# Patient Record
Sex: FEMALE | Race: BLACK OR AFRICAN AMERICAN | NOT HISPANIC OR LATINO | URBAN - METROPOLITAN AREA
[De-identification: names, ages, dates, MRNs, and addresses within clinical notes are randomized per-mention and may not be internally consistent; named-entity substitution may affect disease eponyms.]

---

## 2018-09-01 ENCOUNTER — OUTPATIENT (OUTPATIENT)
Dept: OUTPATIENT SERVICES | Facility: HOSPITAL | Age: 33
LOS: 1 days | End: 2018-09-01
Payer: MEDICAID

## 2018-09-01 PROCEDURE — G9001: CPT

## 2018-09-04 ENCOUNTER — EMERGENCY (EMERGENCY)
Facility: HOSPITAL | Age: 33
LOS: 1 days | Discharge: HOME | End: 2018-09-04
Attending: EMERGENCY MEDICINE | Admitting: EMERGENCY MEDICINE
Payer: MEDICAID

## 2018-09-04 VITALS
DIASTOLIC BLOOD PRESSURE: 61 MMHG | OXYGEN SATURATION: 100 % | SYSTOLIC BLOOD PRESSURE: 115 MMHG | TEMPERATURE: 98 F | RESPIRATION RATE: 16 BRPM | HEART RATE: 84 BPM

## 2018-09-04 LAB
APPEARANCE UR: SIGNIFICANT CHANGE UP
BACTERIA # UR AUTO: HIGH
BILIRUB UR-MCNC: NEGATIVE — SIGNIFICANT CHANGE UP
BLOOD UR QL VISUAL: NEGATIVE — SIGNIFICANT CHANGE UP
COLOR SPEC: YELLOW — SIGNIFICANT CHANGE UP
EPI CELLS # UR: SIGNIFICANT CHANGE UP
GLUCOSE UR-MCNC: NEGATIVE — SIGNIFICANT CHANGE UP
KETONES UR-MCNC: NEGATIVE — SIGNIFICANT CHANGE UP
LEUKOCYTE ESTERASE UR-ACNC: HIGH
MUCOUS THREADS # UR AUTO: SIGNIFICANT CHANGE UP
NITRITE UR-MCNC: NEGATIVE — SIGNIFICANT CHANGE UP
PH UR: 6 — SIGNIFICANT CHANGE UP (ref 5–8)
PROT UR-MCNC: NEGATIVE — SIGNIFICANT CHANGE UP
RBC CASTS # UR COMP ASSIST: SIGNIFICANT CHANGE UP (ref 0–?)
SP GR SPEC: 1.02 — SIGNIFICANT CHANGE UP (ref 1–1.04)
UROBILINOGEN FLD QL: 0.2 — SIGNIFICANT CHANGE UP
WBC UR QL: HIGH (ref 0–?)

## 2018-09-04 PROCEDURE — 76815 OB US LIMITED FETUS(S): CPT | Mod: 26

## 2018-09-04 PROCEDURE — 99283 EMERGENCY DEPT VISIT LOW MDM: CPT | Mod: 25

## 2018-09-04 RX ORDER — CEPHALEXIN 500 MG
1 CAPSULE ORAL
Qty: 10 | Refills: 0 | OUTPATIENT
Start: 2018-09-04 | End: 2018-09-08

## 2018-09-04 NOTE — ED PROVIDER NOTE - PLAN OF CARE
My diagnosis was explained to me by Dr. Cabrera. Rest, drink plenty of fluids.  Advance activity as tolerated.  Continue all previously prescribed medications as directed.  Follow up with your primary care physician in 24-48 hours- bring copies of your results if you were given. If you do not have a primary care physician, call our clinic at [phone], or call [phone].  Return to the Emergency Department for worsening or persistent symptoms OR ANY NEW OR CONCERNING SYMPTOMS including but not limited to vaginal bleeding, abdominal pain, or any other concern to you. Please  your perscriptions. Please take proton-pump inhibitors as discussed.

## 2018-09-04 NOTE — ED PROVIDER NOTE - MEDICAL DECISION MAKING DETAILS
9 wks pregnant here with pregnancy related changes of chronic abd bloating, bad breath, and occasional vomiting since pregnancy began. Pt otherwise no acute sx, no vaginal bleeding or abd pain. Will check urine for ketones, likely pregnancy related GERD, confirm IUP with US, and dispo pt accordingly with education.

## 2018-09-04 NOTE — ED PROVIDER NOTE - OBJECTIVE STATEMENT
32yo F  here 9 weeks pregnant per hx (LMP sometime in ) here with chronic bad breath and bloating which has improved since. Pt states NBNB vomiting 2x per day but is able to keep food down without problem. Pt has no new acute sx. No fever, cp, sob, abd pain, diarrhea, vaginal discharge or bleeding, dysuria, or change of color of urine. Pt does not follow with an OB for pregnancy. Pt states that she's here for reassurance.

## 2018-09-04 NOTE — ED PROVIDER NOTE - PHYSICAL EXAMINATION
Gen: Well appearing, NAD  Head: NCAT  HEENT: PERRL, MMM, normal conjunctiva, anicteric, neck supple  Lung: CTAB, no adventitious sounds  CV: RRR, no murmurs  Abd: soft, NTND, no rebound or guarding, no CVAT  MSK: No edema, no visible deformities  Skin: Warm and dry, no evidence of rash  Psych: normal mood and affect

## 2018-09-04 NOTE — ED PROCEDURE NOTE - PROCEDURE ADDITIONAL DETAILS
focused Ed ultrasound transabdominal OB to evaluate fetal well being.   uterus scanned in two planes in gray scale and m mode  fetal heart rate measured 150  fetal movement noted  impression: live IUP  [facility] [place]  [id number]

## 2018-09-04 NOTE — ED PROVIDER NOTE - PROGRESS NOTE DETAILS
Jim Cabrera DO PGY1 - Educated pt on likely changes due to pregnancy. Given return precautions. Advised on the safety of PPIs.

## 2018-09-04 NOTE — ED ADULT TRIAGE NOTE - CHIEF COMPLAINT QUOTE
Pt states that she has been feeling "bloated and gassy" x 3 weeks, also thinks that she is 8-9 weeks pregnant.  Pt states that she has not seen a doctor for the pregnancy or the bloating.  Denies PMH, denies vaginal bleeding.

## 2018-09-04 NOTE — ED PROVIDER NOTE - ATTENDING CONTRIBUTION TO CARE
Attending Note (Russ): patient with unconfirmed iup complaining of bad taste in mouth. abdomen soft non tender.  IUP confirmed. concern for reflux.

## 2018-09-04 NOTE — ED PROVIDER NOTE - CARE PLAN
Principal Discharge DX:	Pregnancy  Secondary Diagnosis:	GERD (gastroesophageal reflux disease) Principal Discharge DX:	Pregnancy  Assessment and plan of treatment:	My diagnosis was explained to me by Dr. Cabrera. Rest, drink plenty of fluids.  Advance activity as tolerated.  Continue all previously prescribed medications as directed.  Follow up with your primary care physician in 24-48 hours- bring copies of your results if you were given. If you do not have a primary care physician, call our clinic at [phone], or call [phone].  Return to the Emergency Department for worsening or persistent symptoms OR ANY NEW OR CONCERNING SYMPTOMS including but not limited to vaginal bleeding, abdominal pain, or any other concern to you. Please  your perscriptions. Please take proton-pump inhibitors as discussed.  Secondary Diagnosis:	GERD (gastroesophageal reflux disease)

## 2018-09-14 ENCOUNTER — APPOINTMENT (OUTPATIENT)
Dept: OBGYN | Facility: CLINIC | Age: 33
End: 2018-09-14

## 2018-09-14 PROBLEM — Z00.00 ENCOUNTER FOR PREVENTIVE HEALTH EXAMINATION: Status: ACTIVE | Noted: 2018-09-14

## 2018-09-18 DIAGNOSIS — Z71.89 OTHER SPECIFIED COUNSELING: ICD-10-CM

## 2018-10-24 ENCOUNTER — APPOINTMENT (OUTPATIENT)
Dept: OBGYN | Facility: CLINIC | Age: 33
End: 2018-10-24

## 2018-10-26 ENCOUNTER — NON-APPOINTMENT (OUTPATIENT)
Age: 33
End: 2018-10-26

## 2018-10-26 ENCOUNTER — ASOB RESULT (OUTPATIENT)
Age: 33
End: 2018-10-26

## 2018-10-26 ENCOUNTER — LABORATORY RESULT (OUTPATIENT)
Age: 33
End: 2018-10-26

## 2018-10-26 ENCOUNTER — APPOINTMENT (OUTPATIENT)
Dept: OBGYN | Facility: CLINIC | Age: 33
End: 2018-10-26
Payer: COMMERCIAL

## 2018-10-26 VITALS
SYSTOLIC BLOOD PRESSURE: 122 MMHG | HEIGHT: 62 IN | WEIGHT: 143 LBS | BODY MASS INDEX: 26.31 KG/M2 | DIASTOLIC BLOOD PRESSURE: 77 MMHG | HEART RATE: 75 BPM

## 2018-10-26 DIAGNOSIS — Z34.90 ENCOUNTER FOR SUPERVISION OF NORMAL PREGNANCY, UNSPECIFIED, UNSPECIFIED TRIMESTER: ICD-10-CM

## 2018-10-26 DIAGNOSIS — O99.619 DISEASES OF THE DIGESTIVE SYSTEM COMPLICATING PREGNANCY, UNSPECIFIED TRIMESTER: ICD-10-CM

## 2018-10-26 DIAGNOSIS — O21.0 MILD HYPEREMESIS GRAVIDARUM: ICD-10-CM

## 2018-10-26 DIAGNOSIS — K11.7 DISEASES OF THE DIGESTIVE SYSTEM COMPLICATING PREGNANCY, UNSPECIFIED TRIMESTER: ICD-10-CM

## 2018-10-26 PROCEDURE — 0501F PRENATAL FLOW SHEET: CPT

## 2018-10-26 PROCEDURE — 76805 OB US >/= 14 WKS SNGL FETUS: CPT

## 2018-10-26 RX ORDER — DOXYLAMINE SUCCINATE AND PYRIDOXINE HYDROCHLORIDE 10; 10 MG/1; MG/1
10-10 TABLET, DELAYED RELEASE ORAL
Qty: 60 | Refills: 1 | Status: ACTIVE | COMMUNITY
Start: 2018-10-26 | End: 1900-01-01

## 2018-10-27 ENCOUNTER — NON-APPOINTMENT (OUTPATIENT)
Age: 33
End: 2018-10-27

## 2018-11-02 LAB
ABO + RH PNL BLD: NORMAL
ALBUMIN SERPL ELPH-MCNC: 4.1 G/DL
ALP BLD-CCNC: 83 U/L
ALT SERPL-CCNC: 15 U/L
ANION GAP SERPL CALC-SCNC: 22 MMOL/L
AR GENE MUT ANL BLD/T: NEGATIVE
AST SERPL-CCNC: 20 U/L
B19V IGG SER QL IA: 3.3 INDEX
B19V IGG+IGM SER-IMP: NORMAL
B19V IGG+IGM SER-IMP: POSITIVE
B19V IGM FLD-ACNC: 0.1 INDEX
B19V IGM SER-ACNC: NEGATIVE
BACTERIA UR CULT: NORMAL
BASOPHILS # BLD AUTO: 0.01 K/UL
BASOPHILS NFR BLD AUTO: 0.2 %
BILIRUB SERPL-MCNC: 0.5 MG/DL
BLD GP AB SCN SERPL QL: NORMAL
BUN SERPL-MCNC: 8 MG/DL
C TRACH RRNA SPEC QL NAA+PROBE: NOT DETECTED
CALCIUM SERPL-MCNC: 9.3 MG/DL
CFTR MUT TESTED BLD/T: NEGATIVE
CHLORIDE SERPL-SCNC: 99 MMOL/L
CMV IGG SERPL QL: <0.2 U/ML
CMV IGG SERPL-IMP: NEGATIVE
CMV IGM SERPL QL: <8 AU/ML
CMV IGM SERPL QL: NEGATIVE
CO2 SERPL-SCNC: 18 MMOL/L
CREAT SERPL-MCNC: 0.67 MG/DL
CYTOLOGY CVX/VAG DOC THIN PREP: NORMAL
EOSINOPHIL # BLD AUTO: 0.05 K/UL
EOSINOPHIL NFR BLD AUTO: 0.9 %
FMR1 GENE MUT ANL BLD/T: NORMAL
GLUCOSE SERPL-MCNC: NORMAL MG/DL
HBV SURFACE AG SER QL: NONREACTIVE
HCT VFR BLD CALC: 33.5 %
HGB A MFR BLD: 96.8 %
HGB A2 MFR BLD: 3.2 %
HGB BLD-MCNC: 11.2 G/DL
HGB FRACT BLD-IMP: NORMAL
HIV1+2 AB SPEC QL IA.RAPID: NONREACTIVE
HPV HIGH+LOW RISK DNA PNL CVX: NOT DETECTED
IMM GRANULOCYTES NFR BLD AUTO: 0.2 %
LYMPHOCYTES # BLD AUTO: 1.12 K/UL
LYMPHOCYTES NFR BLD AUTO: 20.9 %
MAN DIFF?: NORMAL
MCHC RBC-ENTMCNC: 30.8 PG
MCHC RBC-ENTMCNC: 33.4 GM/DL
MCV RBC AUTO: 92 FL
MONOCYTES # BLD AUTO: 0.23 K/UL
MONOCYTES NFR BLD AUTO: 4.3 %
N GONORRHOEA RRNA SPEC QL NAA+PROBE: NOT DETECTED
NEUTROPHILS # BLD AUTO: 3.94 K/UL
NEUTROPHILS NFR BLD AUTO: 73.5 %
PLATELET # BLD AUTO: 261 K/UL
POTASSIUM SERPL-SCNC: 3.5 MMOL/L
PROT SERPL-MCNC: 6.9 G/DL
RBC # BLD: 3.64 M/UL
RBC # FLD: 13.6 %
RUBV IGG FLD-ACNC: 16.1 INDEX
RUBV IGG SER-IMP: POSITIVE
SODIUM SERPL-SCNC: 139 MMOL/L
SOURCE AMPLIFICATION: NORMAL
T GONDII AB SER-IMP: NEGATIVE
T GONDII AB SER-IMP: NEGATIVE
T GONDII IGG SER QL: <3 IU/ML
T GONDII IGM SER QL: <3 AU/ML
T PALLIDUM AB SER QL IA: NEGATIVE
VZV AB TITR SER: POSITIVE
VZV IGG SER IF-ACNC: 1377 INDEX
WBC # FLD AUTO: 5.36 K/UL

## 2018-11-07 ENCOUNTER — APPOINTMENT (OUTPATIENT)
Dept: ANTEPARTUM | Facility: CLINIC | Age: 33
End: 2018-11-07

## 2018-11-14 ENCOUNTER — ASOB RESULT (OUTPATIENT)
Age: 33
End: 2018-11-14

## 2018-11-14 ENCOUNTER — APPOINTMENT (OUTPATIENT)
Dept: ANTEPARTUM | Facility: CLINIC | Age: 33
End: 2018-11-14
Payer: COMMERCIAL

## 2018-11-14 PROCEDURE — 76811 OB US DETAILED SNGL FETUS: CPT

## 2018-11-26 ENCOUNTER — APPOINTMENT (OUTPATIENT)
Dept: OBGYN | Facility: CLINIC | Age: 33
End: 2018-11-26

## 2018-11-26 ENCOUNTER — NON-APPOINTMENT (OUTPATIENT)
Age: 33
End: 2018-11-26

## 2018-11-26 VITALS
SYSTOLIC BLOOD PRESSURE: 106 MMHG | HEIGHT: 62 IN | BODY MASS INDEX: 27.79 KG/M2 | WEIGHT: 151 LBS | DIASTOLIC BLOOD PRESSURE: 71 MMHG

## 2019-01-22 ENCOUNTER — APPOINTMENT (OUTPATIENT)
Dept: OBGYN | Facility: CLINIC | Age: 34
End: 2019-01-22

## 2019-01-23 ENCOUNTER — APPOINTMENT (OUTPATIENT)
Dept: OBGYN | Facility: CLINIC | Age: 34
End: 2019-01-23

## 2019-01-23 VITALS
WEIGHT: 157 LBS | DIASTOLIC BLOOD PRESSURE: 68 MMHG | HEIGHT: 62 IN | BODY MASS INDEX: 28.89 KG/M2 | SYSTOLIC BLOOD PRESSURE: 103 MMHG

## 2019-01-24 ENCOUNTER — APPOINTMENT (OUTPATIENT)
Dept: OBGYN | Facility: CLINIC | Age: 34
End: 2019-01-24

## 2019-01-26 LAB
BASOPHILS # BLD AUTO: 0.01 K/UL
BASOPHILS NFR BLD AUTO: 0.2 %
EOSINOPHIL # BLD AUTO: 0.28 K/UL
EOSINOPHIL NFR BLD AUTO: 5.6 %
GLUCOSE 1H P 50 G GLC PO SERPL-MCNC: 72 MG/DL
HCT VFR BLD CALC: 31.4 %
HGB BLD-MCNC: 10.1 G/DL
IMM GRANULOCYTES NFR BLD AUTO: 0.8 %
LYMPHOCYTES # BLD AUTO: 0.79 K/UL
LYMPHOCYTES NFR BLD AUTO: 15.9 %
MAN DIFF?: NORMAL
MCHC RBC-ENTMCNC: 30.1 PG
MCHC RBC-ENTMCNC: 32.2 GM/DL
MCV RBC AUTO: 93.5 FL
MONOCYTES # BLD AUTO: 0.44 K/UL
MONOCYTES NFR BLD AUTO: 8.9 %
NEUTROPHILS # BLD AUTO: 3.41 K/UL
NEUTROPHILS NFR BLD AUTO: 68.6 %
PLATELET # BLD AUTO: 282 K/UL
RBC # BLD: 3.36 M/UL
RBC # FLD: 13.3 %
WBC # FLD AUTO: 4.97 K/UL

## 2019-02-04 ENCOUNTER — NON-APPOINTMENT (OUTPATIENT)
Age: 34
End: 2019-02-04

## 2019-02-04 ENCOUNTER — APPOINTMENT (OUTPATIENT)
Dept: OBGYN | Facility: CLINIC | Age: 34
End: 2019-02-04
Payer: COMMERCIAL

## 2019-02-04 VITALS
BODY MASS INDEX: 29.08 KG/M2 | DIASTOLIC BLOOD PRESSURE: 69 MMHG | WEIGHT: 158 LBS | HEIGHT: 62 IN | SYSTOLIC BLOOD PRESSURE: 107 MMHG

## 2019-02-04 PROCEDURE — 0502F SUBSEQUENT PRENATAL CARE: CPT

## 2019-02-08 ENCOUNTER — ASOB RESULT (OUTPATIENT)
Age: 34
End: 2019-02-08

## 2019-02-08 ENCOUNTER — APPOINTMENT (OUTPATIENT)
Dept: ANTEPARTUM | Facility: CLINIC | Age: 34
End: 2019-02-08

## 2019-02-08 ENCOUNTER — APPOINTMENT (OUTPATIENT)
Dept: ANTEPARTUM | Facility: CLINIC | Age: 34
End: 2019-02-08
Payer: COMMERCIAL

## 2019-02-08 PROCEDURE — 76816 OB US FOLLOW-UP PER FETUS: CPT

## 2019-02-08 PROCEDURE — 76819 FETAL BIOPHYS PROFIL W/O NST: CPT

## 2019-02-20 ENCOUNTER — APPOINTMENT (OUTPATIENT)
Dept: OBGYN | Facility: CLINIC | Age: 34
End: 2019-02-20
Payer: COMMERCIAL

## 2019-02-20 ENCOUNTER — NON-APPOINTMENT (OUTPATIENT)
Age: 34
End: 2019-02-20

## 2019-02-20 VITALS
BODY MASS INDEX: 29.44 KG/M2 | HEIGHT: 62 IN | DIASTOLIC BLOOD PRESSURE: 64 MMHG | SYSTOLIC BLOOD PRESSURE: 103 MMHG | WEIGHT: 160 LBS

## 2019-02-20 PROCEDURE — 0502F SUBSEQUENT PRENATAL CARE: CPT

## 2019-02-26 ENCOUNTER — APPOINTMENT (OUTPATIENT)
Age: 34
End: 2019-02-26
Payer: COMMERCIAL

## 2019-02-26 ENCOUNTER — ASOB RESULT (OUTPATIENT)
Age: 34
End: 2019-02-26

## 2019-02-26 PROCEDURE — 76819 FETAL BIOPHYS PROFIL W/O NST: CPT

## 2019-02-26 PROCEDURE — 99213 OFFICE O/P EST LOW 20 MIN: CPT | Mod: 25

## 2019-03-13 ENCOUNTER — APPOINTMENT (OUTPATIENT)
Dept: OBGYN | Facility: CLINIC | Age: 34
End: 2019-03-13
Payer: COMMERCIAL

## 2019-03-13 ENCOUNTER — NON-APPOINTMENT (OUTPATIENT)
Age: 34
End: 2019-03-13

## 2019-03-13 VITALS
DIASTOLIC BLOOD PRESSURE: 68 MMHG | HEIGHT: 62 IN | WEIGHT: 164 LBS | BODY MASS INDEX: 30.18 KG/M2 | SYSTOLIC BLOOD PRESSURE: 106 MMHG

## 2019-03-13 PROCEDURE — 0502F SUBSEQUENT PRENATAL CARE: CPT

## 2019-03-17 LAB
GP B STREP DNA SPEC QL NAA+PROBE: NORMAL
GP B STREP DNA SPEC QL NAA+PROBE: NOT DETECTED
SOURCE GBS: NORMAL

## 2019-03-21 ENCOUNTER — APPOINTMENT (OUTPATIENT)
Dept: OBGYN | Facility: CLINIC | Age: 34
End: 2019-03-21

## 2019-03-27 ENCOUNTER — NON-APPOINTMENT (OUTPATIENT)
Age: 34
End: 2019-03-27

## 2019-03-27 ENCOUNTER — APPOINTMENT (OUTPATIENT)
Dept: OBGYN | Facility: CLINIC | Age: 34
End: 2019-03-27
Payer: COMMERCIAL

## 2019-03-27 VITALS
BODY MASS INDEX: 30.83 KG/M2 | DIASTOLIC BLOOD PRESSURE: 74 MMHG | WEIGHT: 167.56 LBS | HEIGHT: 62 IN | SYSTOLIC BLOOD PRESSURE: 122 MMHG

## 2019-03-27 PROCEDURE — 0502F SUBSEQUENT PRENATAL CARE: CPT

## 2019-04-03 ENCOUNTER — APPOINTMENT (OUTPATIENT)
Dept: OBGYN | Facility: CLINIC | Age: 34
End: 2019-04-03
Payer: MEDICAID

## 2019-04-03 ENCOUNTER — NON-APPOINTMENT (OUTPATIENT)
Age: 34
End: 2019-04-03

## 2019-04-03 VITALS
WEIGHT: 165 LBS | HEIGHT: 62 IN | SYSTOLIC BLOOD PRESSURE: 118 MMHG | BODY MASS INDEX: 30.36 KG/M2 | DIASTOLIC BLOOD PRESSURE: 74 MMHG

## 2019-04-03 PROCEDURE — 0502F SUBSEQUENT PRENATAL CARE: CPT

## 2019-04-08 ENCOUNTER — TRANSCRIPTION ENCOUNTER (OUTPATIENT)
Age: 34
End: 2019-04-08

## 2019-04-08 ENCOUNTER — INPATIENT (INPATIENT)
Facility: HOSPITAL | Age: 34
LOS: 1 days | Discharge: HOME | End: 2019-04-10
Attending: OBSTETRICS & GYNECOLOGY | Admitting: OBSTETRICS & GYNECOLOGY
Payer: MEDICAID

## 2019-04-08 VITALS
SYSTOLIC BLOOD PRESSURE: 117 MMHG | DIASTOLIC BLOOD PRESSURE: 70 MMHG | RESPIRATION RATE: 16 BRPM | TEMPERATURE: 98 F | HEART RATE: 77 BPM

## 2019-04-08 DIAGNOSIS — O26.899 OTHER SPECIFIED PREGNANCY RELATED CONDITIONS, UNSPECIFIED TRIMESTER: ICD-10-CM

## 2019-04-08 DIAGNOSIS — Z3A.00 WEEKS OF GESTATION OF PREGNANCY NOT SPECIFIED: ICD-10-CM

## 2019-04-08 LAB
BASOPHILS # BLD AUTO: 0.02 K/UL — SIGNIFICANT CHANGE UP (ref 0–0.2)
BASOPHILS NFR BLD AUTO: 0.3 % — SIGNIFICANT CHANGE UP (ref 0–2)
BLD GP AB SCN SERPL QL: NEGATIVE — SIGNIFICANT CHANGE UP
EOSINOPHIL # BLD AUTO: 0.04 K/UL — SIGNIFICANT CHANGE UP (ref 0–0.5)
EOSINOPHIL NFR BLD AUTO: 0.6 % — SIGNIFICANT CHANGE UP (ref 0–6)
HCT VFR BLD CALC: 31 % — LOW (ref 34.5–45)
HGB BLD-MCNC: 9.5 G/DL — LOW (ref 11.5–15.5)
IMM GRANULOCYTES NFR BLD AUTO: 0.4 % — SIGNIFICANT CHANGE UP (ref 0–1.5)
LYMPHOCYTES # BLD AUTO: 1.2 K/UL — SIGNIFICANT CHANGE UP (ref 1–3.3)
LYMPHOCYTES # BLD AUTO: 17.9 % — SIGNIFICANT CHANGE UP (ref 13–44)
MCHC RBC-ENTMCNC: 27.7 PG — SIGNIFICANT CHANGE UP (ref 27–34)
MCHC RBC-ENTMCNC: 30.6 % — LOW (ref 32–36)
MCV RBC AUTO: 90.4 FL — SIGNIFICANT CHANGE UP (ref 80–100)
MONOCYTES # BLD AUTO: 0.61 K/UL — SIGNIFICANT CHANGE UP (ref 0–0.9)
MONOCYTES NFR BLD AUTO: 9.1 % — SIGNIFICANT CHANGE UP (ref 2–14)
NEUTROPHILS # BLD AUTO: 4.82 K/UL — SIGNIFICANT CHANGE UP (ref 1.8–7.4)
NEUTROPHILS NFR BLD AUTO: 71.7 % — SIGNIFICANT CHANGE UP (ref 43–77)
NRBC # FLD: 0 K/UL — SIGNIFICANT CHANGE UP (ref 0–0)
PLATELET # BLD AUTO: 277 K/UL — SIGNIFICANT CHANGE UP (ref 150–400)
PMV BLD: 10.6 FL — SIGNIFICANT CHANGE UP (ref 7–13)
RBC # BLD: 3.43 M/UL — LOW (ref 3.8–5.2)
RBC # FLD: 13.9 % — SIGNIFICANT CHANGE UP (ref 10.3–14.5)
RH IG SCN BLD-IMP: POSITIVE — SIGNIFICANT CHANGE UP
RH IG SCN BLD-IMP: POSITIVE — SIGNIFICANT CHANGE UP
WBC # BLD: 6.72 K/UL — SIGNIFICANT CHANGE UP (ref 3.8–10.5)
WBC # FLD AUTO: 6.72 K/UL — SIGNIFICANT CHANGE UP (ref 3.8–10.5)

## 2019-04-08 PROCEDURE — 59400 OBSTETRICAL CARE: CPT | Mod: U9,UB,GC

## 2019-04-08 RX ORDER — DIBUCAINE 1 %
1 OINTMENT (GRAM) RECTAL EVERY 4 HOURS
Qty: 0 | Refills: 0 | Status: DISCONTINUED | OUTPATIENT
Start: 2019-04-08 | End: 2019-04-10

## 2019-04-08 RX ORDER — SODIUM CHLORIDE 9 MG/ML
3 INJECTION INTRAMUSCULAR; INTRAVENOUS; SUBCUTANEOUS EVERY 8 HOURS
Qty: 0 | Refills: 0 | Status: DISCONTINUED | OUTPATIENT
Start: 2019-04-08 | End: 2019-04-08

## 2019-04-08 RX ORDER — TETANUS TOXOID, REDUCED DIPHTHERIA TOXOID AND ACELLULAR PERTUSSIS VACCINE, ADSORBED 5; 2.5; 8; 8; 2.5 [IU]/.5ML; [IU]/.5ML; UG/.5ML; UG/.5ML; UG/.5ML
0.5 SUSPENSION INTRAMUSCULAR ONCE
Qty: 0 | Refills: 0 | Status: DISCONTINUED | OUTPATIENT
Start: 2019-04-08 | End: 2019-04-10

## 2019-04-08 RX ORDER — IBUPROFEN 200 MG
600 TABLET ORAL EVERY 6 HOURS
Qty: 0 | Refills: 0 | Status: COMPLETED | OUTPATIENT
Start: 2019-04-08 | End: 2020-03-06

## 2019-04-08 RX ORDER — KETOROLAC TROMETHAMINE 30 MG/ML
30 SYRINGE (ML) INJECTION ONCE
Qty: 0 | Refills: 0 | Status: DISCONTINUED | OUTPATIENT
Start: 2019-04-08 | End: 2019-04-09

## 2019-04-08 RX ORDER — OXYTOCIN 10 UNIT/ML
333.33 VIAL (ML) INJECTION
Qty: 20 | Refills: 0 | Status: DISCONTINUED | OUTPATIENT
Start: 2019-04-08 | End: 2019-04-09

## 2019-04-08 RX ORDER — AER TRAVELER 0.5 G/1
1 SOLUTION RECTAL; TOPICAL EVERY 4 HOURS
Qty: 0 | Refills: 0 | Status: DISCONTINUED | OUTPATIENT
Start: 2019-04-08 | End: 2019-04-10

## 2019-04-08 RX ORDER — DIBUCAINE 1 %
1 OINTMENT (GRAM) RECTAL EVERY 4 HOURS
Qty: 0 | Refills: 0 | Status: DISCONTINUED | OUTPATIENT
Start: 2019-04-08 | End: 2019-04-08

## 2019-04-08 RX ORDER — AER TRAVELER 0.5 G/1
1 SOLUTION RECTAL; TOPICAL EVERY 4 HOURS
Qty: 0 | Refills: 0 | Status: DISCONTINUED | OUTPATIENT
Start: 2019-04-08 | End: 2019-04-08

## 2019-04-08 RX ORDER — LANOLIN
1 OINTMENT (GRAM) TOPICAL EVERY 6 HOURS
Qty: 0 | Refills: 0 | Status: DISCONTINUED | OUTPATIENT
Start: 2019-04-08 | End: 2019-04-10

## 2019-04-08 RX ORDER — DOCUSATE SODIUM 100 MG
100 CAPSULE ORAL
Qty: 0 | Refills: 0 | Status: DISCONTINUED | OUTPATIENT
Start: 2019-04-08 | End: 2019-04-10

## 2019-04-08 RX ORDER — GLYCERIN ADULT
1 SUPPOSITORY, RECTAL RECTAL AT BEDTIME
Qty: 0 | Refills: 0 | Status: DISCONTINUED | OUTPATIENT
Start: 2019-04-08 | End: 2019-04-10

## 2019-04-08 RX ORDER — SIMETHICONE 80 MG/1
80 TABLET, CHEWABLE ORAL EVERY 6 HOURS
Qty: 0 | Refills: 0 | Status: DISCONTINUED | OUTPATIENT
Start: 2019-04-08 | End: 2019-04-10

## 2019-04-08 RX ORDER — PRAMOXINE HYDROCHLORIDE 150 MG/15G
1 AEROSOL, FOAM RECTAL EVERY 4 HOURS
Qty: 0 | Refills: 0 | Status: DISCONTINUED | OUTPATIENT
Start: 2019-04-08 | End: 2019-04-09

## 2019-04-08 RX ORDER — OXYCODONE HYDROCHLORIDE 5 MG/1
5 TABLET ORAL EVERY 4 HOURS
Qty: 0 | Refills: 0 | Status: DISCONTINUED | OUTPATIENT
Start: 2019-04-08 | End: 2019-04-10

## 2019-04-08 RX ORDER — MAGNESIUM HYDROXIDE 400 MG/1
30 TABLET, CHEWABLE ORAL
Qty: 0 | Refills: 0 | Status: DISCONTINUED | OUTPATIENT
Start: 2019-04-08 | End: 2019-04-10

## 2019-04-08 RX ORDER — OXYTOCIN 10 UNIT/ML
333.33 VIAL (ML) INJECTION
Qty: 20 | Refills: 0 | Status: COMPLETED | OUTPATIENT
Start: 2019-04-08

## 2019-04-08 RX ORDER — ACETAMINOPHEN 500 MG
975 TABLET ORAL EVERY 6 HOURS
Qty: 0 | Refills: 0 | Status: COMPLETED | OUTPATIENT
Start: 2019-04-08 | End: 2020-03-06

## 2019-04-08 RX ORDER — OXYTOCIN 10 UNIT/ML
41.67 VIAL (ML) INJECTION
Qty: 20 | Refills: 0 | Status: DISCONTINUED | OUTPATIENT
Start: 2019-04-08 | End: 2019-04-09

## 2019-04-08 RX ORDER — HYDROCORTISONE 1 %
1 OINTMENT (GRAM) TOPICAL EVERY 4 HOURS
Qty: 0 | Refills: 0 | Status: DISCONTINUED | OUTPATIENT
Start: 2019-04-08 | End: 2019-04-08

## 2019-04-08 RX ORDER — HYDROCORTISONE 1 %
1 OINTMENT (GRAM) TOPICAL EVERY 4 HOURS
Qty: 0 | Refills: 0 | Status: DISCONTINUED | OUTPATIENT
Start: 2019-04-08 | End: 2019-04-09

## 2019-04-08 RX ORDER — DIPHENHYDRAMINE HCL 50 MG
25 CAPSULE ORAL EVERY 6 HOURS
Qty: 0 | Refills: 0 | Status: DISCONTINUED | OUTPATIENT
Start: 2019-04-08 | End: 2019-04-10

## 2019-04-08 RX ORDER — PRAMOXINE HYDROCHLORIDE 150 MG/15G
1 AEROSOL, FOAM RECTAL EVERY 4 HOURS
Qty: 0 | Refills: 0 | Status: DISCONTINUED | OUTPATIENT
Start: 2019-04-08 | End: 2019-04-08

## 2019-04-08 RX ORDER — OXYTOCIN 10 UNIT/ML
41.67 VIAL (ML) INJECTION
Qty: 20 | Refills: 0 | Status: DISCONTINUED | OUTPATIENT
Start: 2019-04-08 | End: 2019-04-08

## 2019-04-08 RX ORDER — OXYCODONE HYDROCHLORIDE 5 MG/1
5 TABLET ORAL
Qty: 0 | Refills: 0 | Status: DISCONTINUED | OUTPATIENT
Start: 2019-04-08 | End: 2019-04-10

## 2019-04-08 RX ORDER — SODIUM CHLORIDE 9 MG/ML
3 INJECTION INTRAMUSCULAR; INTRAVENOUS; SUBCUTANEOUS EVERY 8 HOURS
Qty: 0 | Refills: 0 | Status: DISCONTINUED | OUTPATIENT
Start: 2019-04-08 | End: 2019-04-10

## 2019-04-08 RX ORDER — SODIUM CHLORIDE 9 MG/ML
1000 INJECTION, SOLUTION INTRAVENOUS
Qty: 0 | Refills: 0 | Status: DISCONTINUED | OUTPATIENT
Start: 2019-04-08 | End: 2019-04-08

## 2019-04-08 RX ORDER — SODIUM CHLORIDE 9 MG/ML
1000 INJECTION, SOLUTION INTRAVENOUS ONCE
Qty: 0 | Refills: 0 | Status: COMPLETED | OUTPATIENT
Start: 2019-04-08 | End: 2019-04-08

## 2019-04-08 RX ADMIN — Medication 1000 MILLIUNIT(S)/MIN: at 17:00

## 2019-04-08 RX ADMIN — SODIUM CHLORIDE 2000 MILLILITER(S): 9 INJECTION, SOLUTION INTRAVENOUS at 16:00

## 2019-04-08 RX ADMIN — Medication 125 MILLIUNIT(S)/MIN: at 17:46

## 2019-04-08 NOTE — DISCHARGE NOTE OB - HOSPITAL COURSE
Patient status post normal vaginal delivery of liveborn male infant. Upon discharge, patient is spontaneously voiding, tolerating a regular diet, out of bed, and reports adequate pain control

## 2019-04-08 NOTE — OB PROVIDER DELIVERY SUMMARY - NSPROVIDERDELIVERYNOTE_OBGYN_ALL_OB_FT
Patient status post normal vaginal delivery of liveborn male fetus in OA, apgars 9,9 . spontaneous delivery of placenta,  2nd deg repaired with chromic. ebl [id number]

## 2019-04-08 NOTE — DISCHARGE NOTE OB - PATIENT PORTAL LINK FT
You can access the [facility] Patient Portal, offered by [facility], by registering with the following website: [email or web address]

## 2019-04-08 NOTE — CHART NOTE - NSCHARTNOTEFT_GEN_A_CORE
NP note    Pt seen for AROM. EFM Cat I. [place] adjusted, pt reports ctx q5min. Refuses epidural at this time. AROM performed without incident. Moderate Mec AF. Cat I tracing. Cont to monitor closely. D/W Dr. Carrington.     semaj, NP

## 2019-04-08 NOTE — OB PROVIDER TRIAGE NOTE - HISTORY OF PRESENT ILLNESS
this is a 33 year old  at 40.4 weeks gestational age presents with complaints of contractions, does not require pain management at this time. reports +GFM, denies LOF. reports mild vaginal spotting. Pt reports precipitous delivery with first baby.  ap course complicated by: echogenic cardiac focus, genetic screenings wnl

## 2019-04-08 NOTE — OB PROVIDER H&P - ASSESSMENT
admit for labor  plan discussed with dr heredia  approved for epidural prn  for arom  routine orders

## 2019-04-08 NOTE — DISCHARGE NOTE OB - CARE PLAN
Principal Discharge DX:	Normal vaginal delivery  Goal:	return to normal activity  Assessment and plan of treatment:	diet and activity as tolerated

## 2019-04-08 NOTE — CHART NOTE - NSCHARTNOTEFT_GEN_A_CORE
NP note    Pt seen for increased pressure. VSS, Cat I tracing. Ctx q3-4min. Requesting epidural. SVE unchanged 6/80/-2. Anesthesia paged. D/W Dr. Carrington.     semaj, NP

## 2019-04-08 NOTE — OB RN DELIVERY SUMMARY - NS_CORDBLDREASONA_OBGYN_ALL_OB
Progress Notes by Lanre Henriquez MD at 10/04/17 09:46 AM     Author:  Lanre Henriquez MD Service:  (none) Author Type:  Physician     Filed:  10/04/17 09:46 AM Encounter Date:  9/27/2017 Status:  Signed     :  Lanre Henriquez MD (Physician)            See telephone message[WD1.1M]    Revision History        User Key Date/Time User Provider Type Action    > WD1.1 10/04/17 09:46 AM Lanre Henriquez MD Physician Sign    M - Manual             Mother is RH Positive

## 2019-04-08 NOTE — DISCHARGE NOTE OB - CARE PROVIDER_API CALL
Vanda King (MD)  Obstetrics and Gynecology  [facility] [facility] [address], [place] [address]  Phone: [phone]  Fax: [phone]  Follow Up Time:

## 2019-04-09 LAB — T PALLIDUM AB TITR SER: NEGATIVE — SIGNIFICANT CHANGE UP

## 2019-04-09 RX ORDER — HYDROCORTISONE 1 %
1 OINTMENT (GRAM) TOPICAL EVERY 4 HOURS
Qty: 0 | Refills: 0 | Status: DISCONTINUED | OUTPATIENT
Start: 2019-04-09 | End: 2019-04-10

## 2019-04-09 RX ORDER — ACETAMINOPHEN 500 MG
975 TABLET ORAL EVERY 6 HOURS
Qty: 0 | Refills: 0 | Status: DISCONTINUED | OUTPATIENT
Start: 2019-04-09 | End: 2019-04-10

## 2019-04-09 RX ORDER — IBUPROFEN 200 MG
600 TABLET ORAL EVERY 6 HOURS
Qty: 0 | Refills: 0 | Status: DISCONTINUED | OUTPATIENT
Start: 2019-04-09 | End: 2019-04-10

## 2019-04-09 RX ORDER — PRAMOXINE HYDROCHLORIDE 150 MG/15G
1 AEROSOL, FOAM RECTAL EVERY 4 HOURS
Qty: 0 | Refills: 0 | Status: DISCONTINUED | OUTPATIENT
Start: 2019-04-09 | End: 2019-04-10

## 2019-04-09 NOTE — PROGRESS NOTE ADULT - SUBJECTIVE AND OBJECTIVE BOX
Anesthesia Post-op Note    POD#1 S/P vaginal delivery    Patient is doing well.  OOBAA. Tolerating clears.  Pain is tolerable.  No residual anesthetic issues or complications noted.    Oxana Garcia CRNA

## 2019-04-09 NOTE — PROGRESS NOTE ADULT - SUBJECTIVE AND OBJECTIVE BOX
S: Patient doing well. Minimal lochia. Pain controlled.    O: Vital Signs Last 24 Hrs  T(C): 37.1 (2019 06:00), Max: 37.3 (2019 22:37)  T(F): 98.7 (2019 06:00), Max: 99.1 (2019 22:37)  HR: 60 (2019 06:00) (50 - 90)  BP: 101/55 (2019 06:00) (95/56 - 152/80)  BP(mean): --  RR: 18 (2019 06:00) (17 - 18)  SpO2: 100% (2019 06:00) (89% - 100%)    Gen: NAD  Abd: soft, NT, ND, fundus firm below umbilicus  Lochia: moderate  Ext: no tenderness    Labs:                        9.5    6.72  )-----------( 277      ( 2019 15:37 )             31.0       A: 33y PPD# 1 s/p  doing well.    Plan: doing well  for discharge tomorrow

## 2019-04-09 NOTE — LACTATION INITIAL EVALUATION - LACTATION INTERVENTIONS
initiate skin to skin/initiate hand expression routine/assisted with deep latch and positioning .  discussed  signs  of  effective  feeding and  swallowing. instructed  to offer both  breast at a feeding ,feed on cue and safe  skin to skin. nbn  sleepy  . reviewed  strategies to bring  out nipple

## 2019-04-09 NOTE — LACTATION INITIAL EVALUATION - INTERVENTION OUTCOME
nbn sleepy and  mother  declined  further  assistance  . discussed a nd  demonstrated strategies to wake  nbn  .  rn  aware  offered  assistance  as needed./verbalizes understanding

## 2019-04-10 ENCOUNTER — APPOINTMENT (OUTPATIENT)
Dept: OBGYN | Facility: CLINIC | Age: 34
End: 2019-04-10

## 2019-04-10 VITALS
OXYGEN SATURATION: 98 % | SYSTOLIC BLOOD PRESSURE: 110 MMHG | RESPIRATION RATE: 16 BRPM | DIASTOLIC BLOOD PRESSURE: 65 MMHG | TEMPERATURE: 98 F | HEART RATE: 71 BPM

## 2019-04-10 RX ORDER — ACETAMINOPHEN 500 MG
3 TABLET ORAL
Qty: 0 | Refills: 0 | COMMUNITY
Start: 2019-04-10

## 2019-04-10 RX ORDER — IBUPROFEN 200 MG
1 TABLET ORAL
Qty: 0 | Refills: 0 | COMMUNITY
Start: 2019-04-10

## 2019-04-23 PROBLEM — Z33.2 ENCOUNTER FOR ELECTIVE TERMINATION OF PREGNANCY: Chronic | Status: ACTIVE | Noted: 2019-04-08

## 2019-06-19 ENCOUNTER — APPOINTMENT (OUTPATIENT)
Dept: OBGYN | Facility: CLINIC | Age: 34
End: 2019-06-19
Payer: COMMERCIAL

## 2019-06-19 VITALS
HEIGHT: 62 IN | WEIGHT: 155 LBS | BODY MASS INDEX: 28.52 KG/M2 | SYSTOLIC BLOOD PRESSURE: 112 MMHG | DIASTOLIC BLOOD PRESSURE: 73 MMHG | HEART RATE: 81 BPM

## 2019-06-19 PROCEDURE — XXXXX: CPT

## 2019-06-19 PROCEDURE — 0503F POSTPARTUM CARE VISIT: CPT

## 2020-05-15 NOTE — HISTORY OF PRESENT ILLNESS
[Postpartum Follow Up] : postpartum follow up [Delivery Date: ___] : on [unfilled] [] : delivered by vaginal delivery [Male] : Delivery History: baby boy [Wt. ___] : weighing [unfilled] [Boy] : baby is a boy [Vaginal Delivery] : vaginal delivery [Delivery Date Was ___] : delivery date was [unfilled] [___ Lbs] : [unfilled] lbs [Infant's Name ___] : [unfilled] [Circumcised] : circumcised [Bottle Feeding] : bottle feeding [Living at Home] : is currently living at home [Breastfeeding] : currently nursing [Intended Contraception] : Intended Contraception: [Condoms] : condoms [Back to Normal] : is back to normal in size [None] : None [Examination Of The Breasts] : breasts are normal [Doing Well] : is doing well [No Sign of Infection] : is showing no signs of infection [Excellent Pain Control] : has excellent pain control [Complications:___] : no complications [BF with Difficulty] : nursing without difficulty [Resumed Menses] : has not resumed her menses [Resumed [place]] : has not resumed intercourse [de-identified] : lightheaded [de-identified] : post partum exam, lightheaded [de-identified] : RTO for annual gynecological examination, continue to take iron supplement with vitamin C consistently

## 2020-06-01 NOTE — OB RN DELIVERY SUMMARY - BABY A: APGAR 1 MIN COLOR, DELIVERY
-Immunizations: Up to date with Tdap.   -Pap Hx: Reviewed 2019 and normal- repeat 2022 per ASCCP Guidelines  -Contraception: OCP refilled today- discussed side effects of Rx. Denies current hx or hx of migraines with auras, hypertension, DVT/PE/CVA, or tobacco use.   -Recommend routine exercise including cardio and strength  -TO establish with PCP in [place] for full physical   (1) body pink, extremities blue

## 2022-09-27 NOTE — OB RN PATIENT PROFILE - TERM DELIVERIES, OB PROFILE
Date: 9/27/2022    Re:  Ping Braun        To Whom it May Concern:    This letter is to certify that Ms. Ping Braun was medically evaluated today.     Please excuse her from SCHOOL 9-28-22     Thank you for your consideration of Ms. Braun's health condition.    Regards,      James Shrestha MD .  
      Date: 9/27/2022    Re:  Ping Braun        To Whom it May Concern:    This letter is to certify that Ms. Ping Braun was medically evaluated today.     Please excuse her from WORK on 9-     Thank you for your consideration of Ms. Braun's health condition.    Regards,      James Shrestha MD .  
      Date: 9/27/2022    Re:  Ping Braun        To Whom it May Concern:    This letter is to certify that Ms. Ping Braun was medically evaluated today.     Please excuse her from WORK tomorrow     Thank you for your consideration of Ms. Braun's health condition.    Regards,      James Shrestha MD .  
1

## 2023-06-19 NOTE — OB RN PATIENT PROFILE - FAMILY HISTORY
I concur with the Admission Order and I certify that services are provided in accordance with Section 42 CFR § 412.3 No pertinent family history in first degree relatives

## 2023-12-08 NOTE — ED ADULT TRIAGE NOTE - NS ED NOTE AC HIGH RISK COUNTRIES
LOV: 06/09/2023    Last Refill:   Medication Quantity Refills Start End   FENOFIBRATE 145 MG Oral Tab 90 tablet 1 6/9/2023      RTC: disha joiner 01/03/2024    Protocol: failed No

## 2025-03-05 NOTE — DISCHARGE NOTE OB - NS OB DC TDAP REASON NOT RECEIVED
Met with patient and her mom in clinic today.  Reviewed plan of care.  Referral to Dr. Rausch placed to discuss risk reducing surgery.  They will call her to schedule.  Asked her to call with any questions or concerns.  She thanked me and verbalized understanding.   Refused